# Patient Record
(demographics unavailable — no encounter records)

---

## 2024-10-22 NOTE — HISTORY OF PRESENT ILLNESS
[Procedure: ___] : Procedure performed: [unfilled]  [Date of Surgery: ___] : Date of Surgery:   [unfilled] [Surgeon Name:   ___] : Surgeon Name: Dr. CLAY [Pre-Op Weight ___] : Pre-op weight was [unfilled] lbs [de-identified] : 51-year-old woman s/p Laparoscopic Sleeve Gastrectomy presents for follow-up. 15 lb weight gain since last visit 5 months ago. Pt is interested in starting weight loss medication. Reports postprandial vomiting after eating candy (1x every few weeks at night) and reflux (unchanged since last visit); reports no abdominal pain, nausea, constipation, diarrhea. Patient is trying to eat 3 protein-rich meals/day, is trying to drink adequate zero-calorie fluids/day, taking bariatric vitamins, and is walking daily. No EtOH use since 10 months ago; followed by a therapist.

## 2024-10-22 NOTE — ASSESSMENT
[FreeTextEntry1] : 51-year-old woman s/p Laparoscopic Sleeve Gastrectomy presents for follow-up. 15 lb weight gain since last visit 5 months ago. Pt is interested in starting weight loss medication.  Had a lengthy discussion with the pt about nutrition, exercise, and weight loss medications.   Continue 3 protein-focused meals/day (aim for 60-70 g protein/day); consider yogurt and protein drinks Avoid snacking, especially after dinner snacking (consider drinking water instead) Increase zero calorie fluid intake (aim for at least 64 oz/day) Continue bariatric vitamins Exercise with cardio (aim for 8k-10k steps/day) and start strength training 2-3x/week Use step counter Weigh yourself 1x-2x/week Try the Calm ld or Soothing Pod ld for stress management Pt seen by Nutritionist Isabel Mercado (15 min) at today's visit Complete labs (ordered; fasting; try to complete 1-2 weeks prior to next visit)  Follow-up in 4 weeks at which time will further discuss starting weight loss medication.  All questions answered   Call with any questions or concerns Time before and after visit spent reviewing chart

## 2024-10-22 NOTE — REVIEW OF SYSTEMS
[Patient Intake Form Reviewed] : Patient intake form was reviewed [Negative] : Allergic/Immunologic [Vomiting] : vomiting [Abdominal Pain] : no abdominal pain [Constipation] : no constipation [Diarrhea] : no diarrhea [Reflux/Heartburn] : no reflux/ heartburn [Hernia] : no hernia

## 2024-10-22 NOTE — PHYSICAL EXAM
[Normal] : affect appropriate [de-identified] : soft, NT, ND, no evidence of hernia or diastasis, incisions well-healed

## 2024-12-04 NOTE — ASSESSMENT
[FreeTextEntry1] : 51-year-old woman, s/p Laparoscopic Sleeve Gastrectomy, presents for follow-up and to discuss starting weight loss medication. 2 lb weight gain since last visit 6 weeks ago.   Had a lengthy discussion with the pt about nutrition, exercise, and weight loss medications.   Continue 3 protein-focused meals/day (aim for 60-70 g protein/day) Limit peanuts due to high fat content Avoid snacking, especially after dinner snacking (consider drinking water instead; can add fruit to water) Increase zero calorie fluid intake (aim for at least 64 oz/day) Continue bariatric vitamins Exercise with cardio (aim for 8k-10k steps/day) and start strength training 2-3x/week Use step counter Weigh yourself 1x-2x/week Try the Calm ld or Soothing Pod ld for stress management Follow-up with Nutrition (keep a food log) Last labs done 10/2024 Start Zepbound based on authorization and labs (recent labs reviewed). All risks and benefits have been discussed with the patient. Currently there are no contraindications for the use of Zepbound after reviewing the pt's medical history and labs, including personal and family history of thyroid cancer or MEN 2 Syndrome. Possible side effects of Zepbound were discussed with the patient, including nausea, abdominal pain, reflux, constipation, delayed gastric emptying, gallstones, kidney stones, visual changes, and pancreatitis. Pt verbalizes understanding and wishes to proceed with medical therapy. Instructions for use provided. Pt understands the need for long-term use and understands the risk of weight-gain upon stopping weight loss medications.  Patient educated to call with questions/concerns  All questions answered  Return to office 3 weeks after starting Zepbound; call the office right away with any side effects Discussed with the pt of the warnings of pregnancy while on Zepbound: - instructed pt to avoid planned pregnancy and use contraception  - informed pt that Zepbound can decrease the efficacy of OCPs  - advised pt to stop Zepbound immediately if becomes pregnant  All questions answered   Call with any questions or concerns Time before and after visit spent reviewing chart

## 2024-12-04 NOTE — HISTORY OF PRESENT ILLNESS
[Procedure: ___] : Procedure performed: [unfilled]  [Date of Surgery: ___] : Date of Surgery:   [unfilled] [Surgeon Name:   ___] : Surgeon Name: Dr. CLAY [Pre-Op Weight ___] : Pre-op weight was [unfilled] lbs [de-identified] : 51-year-old woman, s/p Laparoscopic Sleeve Gastrectomy, presents for follow-up and to discuss starting weight loss medication. 2 lb weight gain since last visit 6 weeks ago. Reports no abdominal pain, nausea/vomiting, constipation, diarrhea, reflux/heartburn. Patient eating 3 protein-rich meals/day (nighttime snacking on candy and peanuts), is trying to drink adequate zero-calorie fluids/day, taking bariatric vitamins, and exercising.  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  Patient notes cravings for sugar at night.  She hopes that GLP-1's will decrease cravings.  PMH and FH of: pancreatitis: no gallstones: no kidney stones: no MEN syndrome: no Medullary thyroid cancer: no Eating disorder: no Anxiety: no Glaucoma: no Ophthalmological Contraindications: no Currently taking narcotic pain medication(s) or suboxone: no

## 2024-12-04 NOTE — PHYSICAL EXAM
[Normal] : affect appropriate [de-identified] : soft, NT, ND, no evidence of hernia or diastasis, incisions well-healed

## 2024-12-04 NOTE — HISTORY OF PRESENT ILLNESS
[Procedure: ___] : Procedure performed: [unfilled]  [Date of Surgery: ___] : Date of Surgery:   [unfilled] [Surgeon Name:   ___] : Surgeon Name: Dr. CLAY [Pre-Op Weight ___] : Pre-op weight was [unfilled] lbs [de-identified] : 51-year-old woman, s/p Laparoscopic Sleeve Gastrectomy, presents for follow-up and to discuss starting weight loss medication. 2 lb weight gain since last visit 6 weeks ago. Reports no abdominal pain, nausea/vomiting, constipation, diarrhea, reflux/heartburn. Patient eating 3 protein-rich meals/day (nighttime snacking on candy and peanuts), is trying to drink adequate zero-calorie fluids/day, taking bariatric vitamins, and exercising.  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  Patient notes cravings for sugar at night.  She hopes that GLP-1's will decrease cravings.  PMH and FH of: pancreatitis: no gallstones: no kidney stones: no MEN syndrome: no Medullary thyroid cancer: no Eating disorder: no Anxiety: no Glaucoma: no Ophthalmological Contraindications: no Currently taking narcotic pain medication(s) or suboxone: no

## 2024-12-04 NOTE — PHYSICAL EXAM
[Normal] : affect appropriate [de-identified] : soft, NT, ND, no evidence of hernia or diastasis, incisions well-healed

## 2025-02-06 NOTE — ASSESSMENT
[FreeTextEntry1] : 51-year-old woman, almost 2 years s/p Laparoscopic Sleeve Gastrectomy, presents for follow-up after starting Zepbound. 7 lb weight loss since last visit 2 months ago.   Last blood work done 10/2024 Next blood work due 4/2025 Bariatric labs due 10/2025  Had a lengthy discussion with the pt about nutrition, exercise, and weight loss medications.   Continue 3 protein-focused meals/day (aim for 60-70 g protein/day) Avoid snacking, especially after dinner snacking (consider drinking water instead; can add fruit to water) Increase zero calorie fluid intake (aim for at least 64 oz/day) Continue bariatric vitamins Exercise with cardio (aim for 8k-10k steps/day) and start strength training 2-3x/week Use step counter Weigh yourself 1x-2x/week Try the Calm ld or Soothing Pod ld for stress management Follow-up with Nutrition (keep a food log) Increase Zepbound 5 mg/weekly; pt instructed to call the office with any side effects  Follow up in 6 weeks or sooner if needed  Patient educated to call with questions/concerns All questions answered Call with any questions or concerns Time before and after visit spent reviewing chart

## 2025-02-06 NOTE — REVIEW OF SYSTEMS
[Patient Intake Form Reviewed] : Patient intake form was reviewed [Negative] : Allergic/Immunologic [Abdominal Pain] : no abdominal pain [Vomiting] : no vomiting [Constipation] : constipation [Diarrhea] : no diarrhea [Reflux/Heartburn] : no reflux/ heartburn [Hernia] : no hernia

## 2025-02-06 NOTE — HISTORY OF PRESENT ILLNESS
[Procedure: ___] : Procedure performed: [unfilled]  [Date of Surgery: ___] : Date of Surgery:   [unfilled] [Surgeon Name:   ___] : Surgeon Name: Dr. CLAY [Pre-Op Weight ___] : Pre-op weight was [unfilled] lbs [de-identified] : 51-year-old woman, almost 2 years s/p Laparoscopic Sleeve Gastrectomy, presents for follow-up after starting Zepbound. 7 lb weight loss since last visit 2 months ago. Reports mild constipation managed by miralax; no abdominal pain, nausea/vomiting, diarrhea, reflux/heartburn. Patient eating 3 protein-rich meals/day, is trying to drink adequate zero-calorie fluids/day, taking bariatric vitamins, and exercising by walking 7 days per week, cardio and strength training 3x per week. 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   Starting weight at initial consult: 231 (12/2/24) Current weight at today's visit: 224   Anti-obesity medication(s): Zepbound Start date: 1/11/25 Current dose: 2.5 mg Side-effects from anti-obesity medication(s): mild constipation Obesity comorbidities: preDM, DORA, HTN,  Comorbidities improved/resolved: repeat blood work pending History of bariatric surgery: yes

## 2025-02-06 NOTE — PHYSICAL EXAM
[Normal] : full range of motion and no deformities appreciated [de-identified] : Equal chest rise, non-labored respirations, no audible wheezing.

## 2025-03-20 NOTE — PHYSICAL EXAM
[Normal] : affect appropriate [de-identified] : Equal chest rise, non-labored respirations, no audible wheezing.

## 2025-03-20 NOTE — PHYSICAL EXAM
[Normal] : affect appropriate [de-identified] : Equal chest rise, non-labored respirations, no audible wheezing.

## 2025-03-20 NOTE — ASSESSMENT
[FreeTextEntry1] : 51-year-old woman, almost 2 years s/p Laparoscopic Sleeve Gastrectomy, presents for follow-up after starting Zepbound. 15 lb weight loss since last visit ~6 weeks ago.  Last blood work done 10/2024 Next blood work due 4/2025  Had a lengthy discussion with the pt about nutrition, exercise, and weight loss medications.   Continue 3 protein-focused meals/day (aim for 60-70 g protein/day) Avoid snacking, especially after dinner snacking (consider drinking water instead; can add fruit to water) Increase zero calorie fluid intake (aim for at least 64 oz/day) Continue bariatric vitamins Exercise with cardio (aim for 8k-10k steps/day) and start strength training 2-3x/week Use step counter  Weigh yourself 1x-2x/week Try the Calm ld or Soothing Pod ld for stress management Follow-up with Nutrition (keep a food log)  Increase Zepbound to 7.5 mg/weekly; pt instructed to call the office with any side effects Discussed that rapid weight loss can lead to gallstone formation. Pt is agreeable to start Ursodiol; risks, benefits and side effects reviewed. Rx sent to pharmacy. Pt to complete 6 month course - ending September 2025. Complete blood work prior to next appt  Follow up in 6 weeks or sooner if needed  Patient educated to call with questions/concerns All questions answered Call with any questions or concerns Time before and after visit spent reviewing chart

## 2025-03-20 NOTE — HISTORY OF PRESENT ILLNESS
[Procedure: ___] : Procedure performed: [unfilled]  [Date of Surgery: ___] : Date of Surgery:   [unfilled] [Surgeon Name:   ___] : Surgeon Name: Dr. CLAY [Pre-Op Weight ___] : Pre-op weight was [unfilled] lbs [de-identified] : 51-year-old woman, almost 2 years s/p Laparoscopic Sleeve Gastrectomy, presents for follow-up after starting Zepbound. 15 lb weight loss since last visit ~6 weeks ago. Reports mild constipation; no abdominal pain, nausea/vomiting, diarrhea, reflux/heartburn. Patient eating 3 protein-rich meals/day, is trying to drink adequate zero-calorie fluids/day, taking bariatric vitamins, and exercising by walking 7 days per week, cardio and strength training 5x per week. Patient has been sober for over 1 year on Vivitrol. Pt reports she has been at the same weight (209 lb) for 2 weeks.   Starting weight at initial consult: 231 (12/2/24) Current weight at today's visit: 209   Anti-obesity medication(s): Zepbound Start date: 1/11/25 Current dose: 5 mg Side-effects from anti-obesity medication(s): mild constipation Obesity comorbidities: preDM, DORA, HTN,  Comorbidities improved/resolved: repeat blood work pending History of bariatric surgery: yes

## 2025-03-20 NOTE — HISTORY OF PRESENT ILLNESS
[Procedure: ___] : Procedure performed: [unfilled]  [Date of Surgery: ___] : Date of Surgery:   [unfilled] [Surgeon Name:   ___] : Surgeon Name: Dr. CLAY [Pre-Op Weight ___] : Pre-op weight was [unfilled] lbs [de-identified] : 51-year-old woman, almost 2 years s/p Laparoscopic Sleeve Gastrectomy, presents for follow-up after starting Zepbound. 15 lb weight loss since last visit ~6 weeks ago. Reports mild constipation; no abdominal pain, nausea/vomiting, diarrhea, reflux/heartburn. Patient eating 3 protein-rich meals/day, is trying to drink adequate zero-calorie fluids/day, taking bariatric vitamins, and exercising by walking 7 days per week, cardio and strength training 5x per week. Patient has been sober for over 1 year on Vivitrol. Pt reports she has been at the same weight (209 lb) for 2 weeks.   Starting weight at initial consult: 231 (12/2/24) Current weight at today's visit: 209   Anti-obesity medication(s): Zepbound Start date: 1/11/25 Current dose: 5 mg Side-effects from anti-obesity medication(s): mild constipation Obesity comorbidities: preDM, DORA, HTN,  Comorbidities improved/resolved: repeat blood work pending History of bariatric surgery: yes